# Patient Record
Sex: FEMALE | Race: OTHER | NOT HISPANIC OR LATINO
[De-identification: names, ages, dates, MRNs, and addresses within clinical notes are randomized per-mention and may not be internally consistent; named-entity substitution may affect disease eponyms.]

---

## 2018-01-26 PROBLEM — Z00.00 ENCOUNTER FOR PREVENTIVE HEALTH EXAMINATION: Status: ACTIVE | Noted: 2018-01-26

## 2018-01-29 ENCOUNTER — APPOINTMENT (OUTPATIENT)
Dept: ENDOCRINOLOGY | Facility: CLINIC | Age: 78
End: 2018-01-29
Payer: MEDICARE

## 2018-01-29 ENCOUNTER — CHART COPY (OUTPATIENT)
Age: 78
End: 2018-01-29

## 2018-01-29 PROCEDURE — 99204 OFFICE O/P NEW MOD 45 MIN: CPT

## 2018-01-30 ENCOUNTER — APPOINTMENT (OUTPATIENT)
Dept: ENDOCRINOLOGY | Facility: CLINIC | Age: 78
End: 2018-01-30

## 2018-05-07 ENCOUNTER — APPOINTMENT (OUTPATIENT)
Dept: ENDOCRINOLOGY | Facility: CLINIC | Age: 78
End: 2018-05-07
Payer: MEDICARE

## 2018-05-07 VITALS
DIASTOLIC BLOOD PRESSURE: 70 MMHG | HEIGHT: 60 IN | HEART RATE: 80 BPM | BODY MASS INDEX: 22.78 KG/M2 | WEIGHT: 116 LBS | SYSTOLIC BLOOD PRESSURE: 128 MMHG

## 2018-05-07 PROCEDURE — 99214 OFFICE O/P EST MOD 30 MIN: CPT

## 2018-07-09 ENCOUNTER — APPOINTMENT (OUTPATIENT)
Dept: ENDOCRINOLOGY | Facility: CLINIC | Age: 78
End: 2018-07-09
Payer: MEDICARE

## 2018-07-09 VITALS
BODY MASS INDEX: 24.15 KG/M2 | HEART RATE: 60 BPM | HEIGHT: 60 IN | SYSTOLIC BLOOD PRESSURE: 110 MMHG | DIASTOLIC BLOOD PRESSURE: 70 MMHG | WEIGHT: 123 LBS

## 2018-07-09 PROCEDURE — 99215 OFFICE O/P EST HI 40 MIN: CPT

## 2018-07-09 RX ORDER — ALENDRONATE SODIUM 70 MG/1
70 TABLET ORAL
Qty: 12 | Refills: 2 | Status: COMPLETED | COMMUNITY
End: 2018-07-09

## 2018-07-18 RX ORDER — BLOOD SUGAR DIAGNOSTIC
STRIP MISCELLANEOUS
Qty: 3 | Refills: 6 | Status: ACTIVE | COMMUNITY
Start: 2018-07-18 | End: 1900-01-01

## 2018-07-18 RX ORDER — ALCOHOL ANTISEPTIC PADS
PADS, MEDICATED (EA) TOPICAL
Qty: 3 | Refills: 6 | Status: ACTIVE | COMMUNITY
Start: 2018-07-18 | End: 1900-01-01

## 2018-09-10 ENCOUNTER — APPOINTMENT (OUTPATIENT)
Dept: ENDOCRINOLOGY | Facility: CLINIC | Age: 78
End: 2018-09-10
Payer: MEDICARE

## 2018-09-10 VITALS
BODY MASS INDEX: 24.35 KG/M2 | WEIGHT: 124 LBS | DIASTOLIC BLOOD PRESSURE: 80 MMHG | HEART RATE: 102 BPM | SYSTOLIC BLOOD PRESSURE: 140 MMHG | HEIGHT: 60 IN

## 2018-09-10 PROCEDURE — 99215 OFFICE O/P EST HI 40 MIN: CPT

## 2018-10-10 ENCOUNTER — APPOINTMENT (OUTPATIENT)
Dept: CT IMAGING | Facility: HOSPITAL | Age: 78
End: 2018-10-10

## 2018-10-10 ENCOUNTER — OUTPATIENT (OUTPATIENT)
Dept: OUTPATIENT SERVICES | Facility: HOSPITAL | Age: 78
LOS: 1 days | End: 2018-10-10
Payer: MEDICARE

## 2018-10-10 PROCEDURE — 75574 CT ANGIO HRT W/3D IMAGE: CPT

## 2018-10-10 PROCEDURE — 75574 CT ANGIO HRT W/3D IMAGE: CPT | Mod: 26

## 2018-11-05 ENCOUNTER — APPOINTMENT (OUTPATIENT)
Dept: ENDOCRINOLOGY | Facility: CLINIC | Age: 78
End: 2018-11-05
Payer: MEDICARE

## 2018-11-05 VITALS
BODY MASS INDEX: 23.95 KG/M2 | DIASTOLIC BLOOD PRESSURE: 70 MMHG | WEIGHT: 122 LBS | HEIGHT: 60 IN | SYSTOLIC BLOOD PRESSURE: 140 MMHG | HEART RATE: 90 BPM

## 2018-11-05 PROCEDURE — 99215 OFFICE O/P EST HI 40 MIN: CPT

## 2019-01-09 ENCOUNTER — RX RENEWAL (OUTPATIENT)
Age: 79
End: 2019-01-09

## 2019-04-01 ENCOUNTER — APPOINTMENT (OUTPATIENT)
Dept: ENDOCRINOLOGY | Facility: CLINIC | Age: 79
End: 2019-04-01
Payer: MEDICARE

## 2019-04-01 VITALS
SYSTOLIC BLOOD PRESSURE: 140 MMHG | DIASTOLIC BLOOD PRESSURE: 80 MMHG | WEIGHT: 121 LBS | HEART RATE: 82 BPM | BODY MASS INDEX: 23.75 KG/M2 | HEIGHT: 60 IN

## 2019-04-01 PROCEDURE — 99215 OFFICE O/P EST HI 40 MIN: CPT

## 2019-04-01 RX ORDER — CHOLECALCIFEROL (VITAMIN D3) 25 MCG
25 MCG TABLET ORAL DAILY
Qty: 90 | Refills: 3 | Status: ACTIVE | COMMUNITY
Start: 2019-04-01 | End: 1900-01-01

## 2019-04-01 RX ORDER — ERGOCALCIFEROL 1.25 MG/1
1.25 MG CAPSULE, LIQUID FILLED ORAL
Qty: 12 | Refills: 3 | Status: COMPLETED | COMMUNITY
Start: 2018-01-29 | End: 2019-04-01

## 2019-04-01 NOTE — PHYSICAL EXAM
[Alert] : alert [No Acute Distress] : no acute distress [Well Nourished] : well nourished [Well Developed] : well developed [Normal Sclera/Conjunctiva] : normal sclera/conjunctiva [EOMI] : extra ocular movement intact [Visual Fields Grossly Intact] : visual fields grossly intact [No Lid Lag] : no lid lag [Normal Oropharynx] : the oropharynx was normal [No LAD] : no lymphadenopathy [Thyroid Not Enlarged] : the thyroid was not enlarged [Well Healed Scar] : well healed scar [No Respiratory Distress] : no respiratory distress [No Accessory Muscle Use] : no accessory muscle use [Clear to Auscultation] : lungs were clear to auscultation bilaterally [Normal Rate] : heart rate was normal  [Normal S1, S2] : normal S1 and S2 [Regular Rhythm] : with a regular rhythm [Pedal Pulses Normal] : the pedal pulses are present [No Edema] : there was no peripheral edema [Normal Bowel Sounds] : normal bowel sounds [Not Tender] : non-tender [Soft] : abdomen soft [Not Distended] : not distended [Post Cervical Nodes] : posterior cervical nodes [Anterior Cervical Nodes] : anterior cervical nodes [Axillary Nodes] : axillary nodes [Normal] : normal and non tender [No Spinal Tenderness] : no spinal tenderness [Spine Straight] : spine straight [No Stigmata of Cushings Syndrome] : no stigmata of cushings syndrome [Normal Gait] : normal gait [Normal Strength/Tone] : muscle strength and tone were normal [No Rash] : no rash [Acanthosis Nigricans] : no acanthosis nigricans [Normal Reflexes] : deep tendon reflexes were 2+ and symmetric [No Tremors] : no tremors [Oriented x3] : oriented to person, place, and time [de-identified] : + proptosis L > R

## 2019-04-01 NOTE — HISTORY OF PRESENT ILLNESS
[FreeTextEntry1] : 80 y/o F w/ Hx of DM2, OP, graves disease c/b uphthalmopathy s/p TT. Here for f/u.\par \par 11/2018 Since LV, we increased prednisone to 20 mg BID. She reports improved ocular pain, visual acuity and reduced proptosis. Has apt for R eye decompressive sx in 11/20/2018. Since, she reports improvement of retro-ocular pain and swealling. No worsening proptosis or redness. Notes she feels well on 88 mcg, 10.2018 TSH at 2.8. Cards recently started her on Jardiance 25 mg PO QD. No reported nocturia or UTIs. Compliant w. lantus 15 units QHS, Victoza 1.8 mg QD, but only using Humalog 8 units AC lunch. \par \par 4/2019: Here for /fu, generally feels well and endorses no acute complaints. No interval events since LV. Today reports stable ophthalmopathy, denies pain, redness or worsening proptosis. compliant w/ prednisone 40 mg. reports ophthalmology f/u in 4/2019, plans for surgery were delayed given treatment response. FSG remain ~130-180. No hypoglycemia reported.\par She otherwise denies any f/c, CP, SOB, palpitations, tremors, depressed mood, anxiety, palpitations, n/v, stool/urinary abn, skin/weight changes, heat/cold intolerance, HAs, breast/nipple changes, polyuria/polydipsia/nocturia or other complaints. \par \par \par 9/2018: Here fo f/u, reports ongoing hyperglycemia. Tolerated max dose of victoza and Lantus 15 units QHS well. Notes worsening L eye proptosis w/o worsened eye sight. \par  She otherwise denies any f/c, CP, SOB, palpitations, tremors, depressed mood, anxiety, palpitations, n/v, stool/urinary abn, skin/weight changes, heat/cold intolerance, HAs, breast/nipple changes, polyuria/polydipsia/nocturia or other complaints.\par she endorses worsening hyperglycemia since starting steroids.\par Saw ophthalmology on 7/2018, she is waiting for evaluation of proptosis specialist.

## 2019-04-01 NOTE — ASSESSMENT
[FreeTextEntry1] : dm2: cont Lantus to 15 units QHS, contvicotza t o1.8 mg QD. cont humalog 8 units TIDAC Reassess on NV. Continue jardiance 25 mg for now, reviewed r/b/a and s/e including polyuria, bone density drops and UTIs. Verbalized understanding and agrees with treatment plan, will contact MD and seek emergency medical care if condition changes.\par \par \par Hypothyroidism, appears clinically/biochemically euthyroid,continue 88 mcg, proper intake reviewed. repeat TFTs prior to NV\par \par graves eye disease: moderate severity, clinical disease score stabilized, eye sight conserved, continue increased prednisone 40 mg QD, cont f/u w/ ophthalmology. Consider rituximab on the NV.\par \par OP screening: Reports no recent screening. Not taking vitamin D. Not on folsamax for unclear reasons. [Carbohydrate Consistent Diet] : carbohydrate consistent diet [Diabetes Foot Care] : diabetes foot care [Long Term Vascular Complications] : long term vascular complications of diabetes [Importance of Diet and Exercise] : importance of diet and exercise to improve glycemic control, achieve weight loss and improve cardiovascular health [Action and use of Insulin] : action and use of short and long-acting insulin [Incretin Mimetic Therapy] : Risks and benefits of incretin mimetic therapy were discussed with the patient including nauseau, pancreatitis and potential risk of medullary thyroid cancer

## 2019-07-15 ENCOUNTER — APPOINTMENT (OUTPATIENT)
Dept: ENDOCRINOLOGY | Facility: CLINIC | Age: 79
End: 2019-07-15
Payer: MEDICARE

## 2019-07-15 VITALS
SYSTOLIC BLOOD PRESSURE: 130 MMHG | HEIGHT: 60 IN | DIASTOLIC BLOOD PRESSURE: 70 MMHG | HEART RATE: 80 BPM | WEIGHT: 125 LBS | BODY MASS INDEX: 24.54 KG/M2

## 2019-07-15 PROCEDURE — 99215 OFFICE O/P EST HI 40 MIN: CPT

## 2019-07-15 NOTE — PHYSICAL EXAM
[Alert] : alert [No Acute Distress] : no acute distress [Well Nourished] : well nourished [Well Developed] : well developed [Normal Sclera/Conjunctiva] : normal sclera/conjunctiva [EOMI] : extra ocular movement intact [Visual Fields Grossly Intact] : visual fields grossly intact [No Lid Lag] : no lid lag [Normal Oropharynx] : the oropharynx was normal [No LAD] : no lymphadenopathy [Thyroid Not Enlarged] : the thyroid was not enlarged [Well Healed Scar] : well healed scar [No Respiratory Distress] : no respiratory distress [No Accessory Muscle Use] : no accessory muscle use [Clear to Auscultation] : lungs were clear to auscultation bilaterally [Normal Rate] : heart rate was normal  [Normal S1, S2] : normal S1 and S2 [Regular Rhythm] : with a regular rhythm [Pedal Pulses Normal] : the pedal pulses are present [No Edema] : there was no peripheral edema [Normal Bowel Sounds] : normal bowel sounds [Not Tender] : non-tender [Soft] : abdomen soft [Not Distended] : not distended [Post Cervical Nodes] : posterior cervical nodes [Anterior Cervical Nodes] : anterior cervical nodes [Axillary Nodes] : axillary nodes [Normal] : normal and non tender [No Spinal Tenderness] : no spinal tenderness [Spine Straight] : spine straight [No Stigmata of Cushings Syndrome] : no stigmata of cushings syndrome [Normal Gait] : normal gait [Normal Strength/Tone] : muscle strength and tone were normal [No Rash] : no rash [Normal Reflexes] : deep tendon reflexes were 2+ and symmetric [No Tremors] : no tremors [Oriented x3] : oriented to person, place, and time [Acanthosis Nigricans] : no acanthosis nigricans [de-identified] : + proptosis L > R

## 2019-07-15 NOTE — ASSESSMENT
[Carbohydrate Consistent Diet] : carbohydrate consistent diet [Diabetes Foot Care] : diabetes foot care [Long Term Vascular Complications] : long term vascular complications of diabetes [Importance of Diet and Exercise] : importance of diet and exercise to improve glycemic control, achieve weight loss and improve cardiovascular health [Action and use of Insulin] : action and use of short and long-acting insulin [Incretin Mimetic Therapy] : Risks and benefits of incretin mimetic therapy were discussed with the patient including nauseau, pancreatitis and potential risk of medullary thyroid cancer [FreeTextEntry1] : dm2: cont Lantus to 15 units QHS, cont vicotza to1.8 mg QD. increase humalog to 12 units TIDAC Reassess on NV. Stop jardiance 25 mg given reported vaginosis. Verbalized understanding and agrees with treatment plan, will contact MD and seek emergency medical care if condition changes.\par \par \par Hypothyroidism, appears clinically/biochemically euthyroid,continue 88 mcg, proper intake reviewed. repeat TFTs prior to NV\par \par graves eye disease: moderate severity, clinical disease score stabilized, eye sight conserved, continue prednisone 40 mg QD, cont f/u w/ ophthalmology, next due on 10/2019. Consider rituximab on the NV.\par \par OP screening: Reports no recent screening. Not taking vitamin D. Not on folsamax for unclear reasons. Referral for DEXA on 10/2019

## 2019-07-15 NOTE — HISTORY OF PRESENT ILLNESS
[FreeTextEntry1] : 80 y/o F w/ Hx of DM2, OP, graves disease c/b uphthalmopathy s/p TT. Here for f/u.\par \par 11/2018 Since LV, we increased prednisone to 20 mg BID. She reports improved ocular pain, visual acuity and reduced proptosis. Has apt for R eye decompressive sx in 11/20/2018. Since, she reports improvement of retro-ocular pain and swealling. No worsening proptosis or redness. Notes she feels well on 88 mcg, 10.2018 TSH at 2.8. Cards recently started her on Jardiance 25 mg PO QD. No reported nocturia or UTIs. Compliant w. lantus 15 units QHS, Victoza 1.8 mg QD, but only using Humalog 8 units AC lunch. \par \par 7/2019: Here for /fu, generally feels well and endorses no acute complaints. No interval events since LV. Today reports stable ophthalmopathy, denies pain, redness or worsening proptosis. compliant w/ prednisone 40 mg. reports ophthalmology f/u in 4/2019, plans for surgery were delayed given treatment response. FSG remain ~130-180. No hypoglycemia reported. rogel sendorse vaginal itching and rash, she is taking jardiance 25 mg PO QD (started by cards).\par She otherwise denies any f/c, CP, SOB, palpitations, tremors, depressed mood, anxiety, palpitations, n/v, stool/urinary abn, skin/weight changes, heat/cold intolerance, HAs, breast/nipple changes, polyuria/polydipsia/nocturia or other complaints. \par \par \par 9/2018: Here fo f/u, reports ongoing hyperglycemia. Tolerated max dose of victoza and Lantus 15 units QHS well. Notes worsening L eye proptosis w/o worsened eye sight. \par  She otherwise denies any f/c, CP, SOB, palpitations, tremors, depressed mood, anxiety, palpitations, n/v, stool/urinary abn, skin/weight changes, heat/cold intolerance, HAs, breast/nipple changes, polyuria/polydipsia/nocturia or other complaints.\par she endorses worsening hyperglycemia since starting steroids.\par Saw ophthalmology on 7/2018, she is waiting for evaluation of proptosis specialist.

## 2019-10-21 ENCOUNTER — APPOINTMENT (OUTPATIENT)
Dept: ENDOCRINOLOGY | Facility: CLINIC | Age: 79
End: 2019-10-21
Payer: MEDICARE

## 2019-10-21 VITALS
SYSTOLIC BLOOD PRESSURE: 120 MMHG | HEIGHT: 60 IN | WEIGHT: 129 LBS | BODY MASS INDEX: 25.32 KG/M2 | DIASTOLIC BLOOD PRESSURE: 80 MMHG | HEART RATE: 69 BPM

## 2019-10-21 PROCEDURE — 99215 OFFICE O/P EST HI 40 MIN: CPT

## 2019-10-21 RX ORDER — PREDNISONE 20 MG/1
20 TABLET ORAL DAILY
Qty: 60 | Refills: 3 | Status: ACTIVE | COMMUNITY
Start: 2018-05-07 | End: 1900-01-01

## 2019-10-21 NOTE — PHYSICAL EXAM
[Alert] : alert [No Acute Distress] : no acute distress [Well Nourished] : well nourished [Well Developed] : well developed [Normal Sclera/Conjunctiva] : normal sclera/conjunctiva [EOMI] : extra ocular movement intact [Visual Fields Grossly Intact] : visual fields grossly intact [No Lid Lag] : no lid lag [No LAD] : no lymphadenopathy [Normal Oropharynx] : the oropharynx was normal [Thyroid Not Enlarged] : the thyroid was not enlarged [Well Healed Scar] : well healed scar [No Respiratory Distress] : no respiratory distress [No Accessory Muscle Use] : no accessory muscle use [Clear to Auscultation] : lungs were clear to auscultation bilaterally [Normal Rate] : heart rate was normal  [Normal S1, S2] : normal S1 and S2 [Regular Rhythm] : with a regular rhythm [Pedal Pulses Normal] : the pedal pulses are present [No Edema] : there was no peripheral edema [Normal Bowel Sounds] : normal bowel sounds [Not Tender] : non-tender [Soft] : abdomen soft [Not Distended] : not distended [Post Cervical Nodes] : posterior cervical nodes [Anterior Cervical Nodes] : anterior cervical nodes [Axillary Nodes] : axillary nodes [Normal] : normal and non tender [No Spinal Tenderness] : no spinal tenderness [Spine Straight] : spine straight [No Stigmata of Cushings Syndrome] : no stigmata of cushings syndrome [Normal Gait] : normal gait [Normal Strength/Tone] : muscle strength and tone were normal [No Rash] : no rash [Normal Reflexes] : deep tendon reflexes were 2+ and symmetric [No Tremors] : no tremors [Oriented x3] : oriented to person, place, and time [Acanthosis Nigricans] : no acanthosis nigricans [de-identified] : + proptosis L > R

## 2019-10-21 NOTE — ASSESSMENT
[Carbohydrate Consistent Diet] : carbohydrate consistent diet [Diabetes Foot Care] : diabetes foot care [Long Term Vascular Complications] : long term vascular complications of diabetes [Importance of Diet and Exercise] : importance of diet and exercise to improve glycemic control, achieve weight loss and improve cardiovascular health [Incretin Mimetic Therapy] : Risks and benefits of incretin mimetic therapy were discussed with the patient including nauseau, pancreatitis and potential risk of medullary thyroid cancer [Action and use of Insulin] : action and use of short and long-acting insulin [FreeTextEntry1] : dm2: cont Lantus to 15 units QHS, cont vicotza to1.8 mg QD. increase humalog to 15 units TIDAC Reassess on NV. Stop jardiance 25 mg given reported vaginosis. Verbalized understanding and agrees with treatment plan, will contact MD and seek emergency medical care if condition changes. explained the potential high risk and toxicities with chronic insulin use including, but not limited to life threatening hypoglycemia and death, Verbalized understanding and agrees with treatment plan, will contact MD and seek emergency medical care if condition changes.\par \par \par \par Hypothyroidism, appears clinically/biochemically euthyroid,[id number] mcg, proper intake reviewed. repeat TFTs prior to NV\par \par graves eye disease: moderate severity, clinical disease score stabilized, eye sight conserved, continue prednisone 40 mg QD, cont f/u w/ ophthalmology, next due on 10/2019. Consider rituximab on the NV.\par \par OP screening: Reports no recent screening. Not taking vitamin D. Not on folsamax for unclear reasons. Referral for DEXA on 10/2019

## 2020-01-27 ENCOUNTER — APPOINTMENT (OUTPATIENT)
Dept: ENDOCRINOLOGY | Facility: CLINIC | Age: 80
End: 2020-01-27

## 2020-03-09 ENCOUNTER — APPOINTMENT (OUTPATIENT)
Dept: ENDOCRINOLOGY | Facility: CLINIC | Age: 80
End: 2020-03-09

## 2021-08-09 ENCOUNTER — APPOINTMENT (OUTPATIENT)
Dept: ENDOCRINOLOGY | Facility: CLINIC | Age: 81
End: 2021-08-09
Payer: MEDICARE

## 2021-08-09 VITALS
HEART RATE: 84 BPM | WEIGHT: 132 LBS | HEIGHT: 60 IN | DIASTOLIC BLOOD PRESSURE: 80 MMHG | SYSTOLIC BLOOD PRESSURE: 140 MMHG | BODY MASS INDEX: 25.91 KG/M2

## 2021-08-09 PROCEDURE — 99215 OFFICE O/P EST HI 40 MIN: CPT

## 2021-08-09 NOTE — HISTORY OF PRESENT ILLNESS
[FreeTextEntry1] : 80 y/o F w/ Hx of DM2, OP, graves disease c/b uphthalmopathy s/p TT. Here for f/u.\par \par  \par \par 8/2021: Here to re establish care, lost to f/u x 2 yearas, generally feels well and endorses no acute complaints. Interval recurrent ED visits for dysuria and recurrent UTIs. Today reports stable ophthalmopathy, not on steroids at ths time. reports ophthalmology f/u in 4/2021, no plans for surgery were delayed given treatment response. FSG remain ~. hypoglycemia reported she is taking jardiance 25 mg PO QD (restarted by cards). PCP instructed her to stop victoza/humalog and increase Lantus to 35units, reports episodes of severe early AM hypoglycemia which wake her up\par She otherwise denies any f/c, CP, SOB, palpitations, tremors, depressed mood, anxiety, palpitations, n/v, stool/urinary abn, skin/weight changes, heat/cold intolerance, HAs, breast/nipple changes, polyuria/polydipsia/nocturia or other complaints. \par \par \par 9/2018: Here fo f/u, reports ongoing hyperglycemia. Tolerated max dose of victoza and Lantus 15 units QHS well. Notes worsening L eye proptosis w/o worsened eye sight. \par  She otherwise denies any f/c, CP, SOB, palpitations, tremors, depressed mood, anxiety, palpitations, n/v, stool/urinary abn, skin/weight changes, heat/cold intolerance, HAs, breast/nipple changes, polyuria/polydipsia/nocturia or other complaints.\par she endorses worsening hyperglycemia since starting steroids.\par Saw ophthalmology on 7/2018, she is waiting for evaluation of proptosis specialist.

## 2021-08-30 ENCOUNTER — NON-APPOINTMENT (OUTPATIENT)
Age: 81
End: 2021-08-30

## 2021-09-02 RX ORDER — INSULIN GLARGINE 100 [IU]/ML
100 INJECTION, SOLUTION SUBCUTANEOUS
Qty: 2 | Refills: 3 | Status: COMPLETED | COMMUNITY
Start: 2018-07-09 | End: 2021-09-02

## 2021-09-07 ENCOUNTER — NON-APPOINTMENT (OUTPATIENT)
Age: 81
End: 2021-09-07

## 2021-10-11 ENCOUNTER — APPOINTMENT (OUTPATIENT)
Dept: ENDOCRINOLOGY | Facility: CLINIC | Age: 81
End: 2021-10-11
Payer: MEDICARE

## 2021-10-11 PROCEDURE — 99215 OFFICE O/P EST HI 40 MIN: CPT

## 2021-10-11 RX ORDER — LIRAGLUTIDE 6 MG/ML
18 INJECTION SUBCUTANEOUS DAILY
Qty: 3 | Refills: 5 | Status: COMPLETED | COMMUNITY
Start: 2019-07-15 | End: 2021-10-11

## 2021-10-11 RX ORDER — LIRAGLUTIDE 6 MG/ML
18 INJECTION SUBCUTANEOUS DAILY
Qty: 3 | Refills: 3 | Status: COMPLETED | COMMUNITY
End: 2021-10-11

## 2021-10-11 RX ORDER — INSULIN LISPRO 100 U/ML
100 INJECTION, SOLUTION SUBCUTANEOUS
Qty: 2 | Refills: 1 | Status: ACTIVE | COMMUNITY
Start: 2018-09-10 | End: 1900-01-01

## 2021-10-11 NOTE — HISTORY OF PRESENT ILLNESS
[FreeTextEntry1] : 82 y/o F w/ Hx of DM2, OP, graves disease c/b uphthalmopathy s/p TT. Here for f/u.\par \par  \par \par 10/2021: Her for f/u, generally feels well and endorses no acute complaints. no further UTIs or ED visits after jardiance d/anjel. complisnt e/ basal bolus regimen as instructed\par She otherwise denies any f/c, CP, SOB, palpitations, tremors, depressed mood, anxiety, palpitations, n/v, stool/urinary abn, skin/weight changes, heat/cold intolerance, HAs, breast/nipple changes, polyuria/polydipsia/nocturia or other complaints. \par \par \par 9/2018: Here fo f/u, reports ongoing hyperglycemia. Tolerated max dose of victoza and Lantus 15 units QHS well. Notes worsening L eye proptosis w/o worsened eye sight. \par  She otherwise denies any f/c, CP, SOB, palpitations, tremors, depressed mood, anxiety, palpitations, n/v, stool/urinary abn, skin/weight changes, heat/cold intolerance, HAs, breast/nipple changes, polyuria/polydipsia/nocturia or other complaints.\par she endorses worsening hyperglycemia since starting steroids.\par Saw ophthalmology on 7/2018, she is waiting for evaluation of proptosis specialist.

## 2021-10-11 NOTE — ASSESSMENT
[Carbohydrate Consistent Diet] : carbohydrate consistent diet [Diabetes Foot Care] : diabetes foot care [Long Term Vascular Complications] : long term vascular complications of diabetes [Importance of Diet and Exercise] : importance of diet and exercise to improve glycemic control, achieve weight loss and improve cardiovascular health [Action and use of Insulin] : action and use of short and long-acting insulin [Incretin Mimetic Therapy] : Risks and benefits of incretin mimetic therapy were discussed with the patient including nauseau, pancreatitis and potential risk of medullary thyroid cancer [FreeTextEntry1] : dm2: cont Tresiba to 20 units QHS, restart admelog to 12 units TIDAC Reassess on NV. Stop jardiance 25 mg given reported recurrent vaginosis. Verbalized understanding and agrees with treatment plan, will contact MD and seek emergency medical care if condition changes. explained the potential high risk and toxicities with chronic insulin use including, but not limited to life threatening hypoglycemia and death, Verbalized understanding and agrees with treatment plan, will contact MD and seek emergency medical care if condition changes.\par \par \par \par Hypothyroidism, appears clinically/biochemically euthyroid,continue 88 mcg, proper intake reviewed. repeat TFTs prior to NV\par \par graves eye disease: moderate severity, clinical disease score stabilized, eye sight conserved, hold prednisone 40 mg QD, cont f/u w/ ophthalmology, next due on 10/2021. Consider rituximab on the NV.\par \par OP screening: Reports no recent screening. Not taking vitamin D. Not on folsamax for unclear reasons. Referral for DEXA on 10/2019

## 2021-11-22 ENCOUNTER — RX RENEWAL (OUTPATIENT)
Age: 81
End: 2021-11-22

## 2022-01-25 RX ORDER — LANCETS 33 GAUGE
EACH MISCELLANEOUS
Qty: 400 | Refills: 5 | Status: ACTIVE | COMMUNITY
Start: 2022-01-25 | End: 1900-01-01

## 2022-02-14 ENCOUNTER — APPOINTMENT (OUTPATIENT)
Dept: ENDOCRINOLOGY | Facility: CLINIC | Age: 82
End: 2022-02-14
Payer: MEDICARE

## 2022-02-14 DIAGNOSIS — E10.8 TYPE 1 DIABETES MELLITUS WITH UNSPECIFIED COMPLICATIONS: ICD-10-CM

## 2022-02-14 DIAGNOSIS — E10.65 TYPE 1 DIABETES MELLITUS WITH UNSPECIFIED COMPLICATIONS: ICD-10-CM

## 2022-02-14 PROCEDURE — 99215 OFFICE O/P EST HI 40 MIN: CPT

## 2022-02-14 NOTE — ASSESSMENT
[FreeTextEntry1] : dm2: cont Tresiba to 20 units QHS, reduce admelog to 10 units TIDAC Reassess on NV. Stop jardiance 25 mg given reported recurrent vaginosis. Verbalized understanding and agrees with treatment plan, will contact MD and seek emergency medical care if condition changes. explained the potential high risk and toxicities with chronic insulin use including, but not limited to life threatening hypoglycemia and death, Verbalized understanding and agrees with treatment plan, will contact MD and seek emergency medical care if condition changes.\par \par \par \par Hypothyroidism, appears clinically/biochemically euthyroid,continue 88 mcg, proper intake reviewed. repeat TFTs prior to NV\par \par graves eye disease: moderate severity, clinical disease score stabilized, eye sight conserved, hold prednisone 40 mg QD, cont f/u w/ ophthalmology, next due on 10/2021. Consider rituximab on the NV.\par \par OP screening: Reports no recent screening. Not taking vitamin D. Not on folsamax for unclear reasons. Referral for DEXA on 10/2019 [Carbohydrate Consistent Diet] : carbohydrate consistent diet [Diabetes Foot Care] : diabetes foot care [Long Term Vascular Complications] : long term vascular complications of diabetes [Importance of Diet and Exercise] : importance of diet and exercise to improve glycemic control, achieve weight loss and improve cardiovascular health [Action and use of Insulin] : action and use of short and long-acting insulin [Incretin Mimetic Therapy] : Risks and benefits of incretin mimetic therapy were discussed with the patient including nauseau, pancreatitis and potential risk of medullary thyroid cancer

## 2022-02-14 NOTE — HISTORY OF PRESENT ILLNESS
[FreeTextEntry1] : 82 y/o F w/ Hx of DM2, OP, graves disease c/b uphthalmopathy s/p TT. Here for f/u.\par \par  \par \par 2/2022: Her for f/u, generally feels well and endorses no acute complaints. no further UTIs or ED visits after jardiance d/anjel. compliant w/ basal bolus regimen as instructed. FSG in AM ~ 120, post prandial hyperglycemia resolved.\par She otherwise denies any f/c, CP, SOB, palpitations, tremors, depressed mood, anxiety, palpitations, n/v, stool/urinary abn, skin/weight changes, heat/cold intolerance, HAs, breast/nipple changes, polyuria/polydipsia/nocturia or other complaints. \par \par \par 9/2018: Here fo f/u, reports ongoing hyperglycemia. Tolerated max dose of victoza and Lantus 15 units QHS well. Notes worsening L eye proptosis w/o worsened eye sight. \par  She otherwise denies any f/c, CP, SOB, palpitations, tremors, depressed mood, anxiety, palpitations, n/v, stool/urinary abn, skin/weight changes, heat/cold intolerance, HAs, breast/nipple changes, polyuria/polydipsia/nocturia or other complaints.\par she endorses worsening hyperglycemia since starting steroids.\par Saw ophthalmology on 7/2018, she is waiting for evaluation of proptosis specialist.

## 2022-07-18 ENCOUNTER — APPOINTMENT (OUTPATIENT)
Dept: ENDOCRINOLOGY | Facility: CLINIC | Age: 82
End: 2022-07-18

## 2022-07-18 VITALS
SYSTOLIC BLOOD PRESSURE: 140 MMHG | WEIGHT: 140 LBS | HEART RATE: 79 BPM | HEIGHT: 60 IN | BODY MASS INDEX: 27.48 KG/M2 | DIASTOLIC BLOOD PRESSURE: 80 MMHG

## 2022-07-18 PROCEDURE — 99215 OFFICE O/P EST HI 40 MIN: CPT

## 2022-07-18 NOTE — HISTORY OF PRESENT ILLNESS
[FreeTextEntry1] : 83 y/o F w/ Hx of DM2, OP, graves disease c/b uphthalmopathy s/p TT. Here for f/u.\par \par  \par \par 7/2022: Her for f/u, generally feels well and endorses no acute complaints. no further UTIs or ED visits after jardiance d/anjel. compliant w/ basal bolus regimen as instructed. FSG in AM ~ 120, post prandial hyperglycemia resolved.\par She otherwise denies any f/c, CP, SOB, palpitations, tremors, depressed mood, anxiety, palpitations, n/v, stool/urinary abn, skin/weight changes, heat/cold intolerance, HAs, breast/nipple changes, polyuria/polydipsia/nocturia or other complaints. \par \par \par 9/2018: Here fo f/u, reports ongoing hyperglycemia. Tolerated max dose of victoza and Lantus 15 units QHS well. Notes worsening L eye proptosis w/o worsened eye sight. \par  She otherwise denies any f/c, CP, SOB, palpitations, tremors, depressed mood, anxiety, palpitations, n/v, stool/urinary abn, skin/weight changes, heat/cold intolerance, HAs, breast/nipple changes, polyuria/polydipsia/nocturia or other complaints.\par she endorses worsening hyperglycemia since starting steroids.\par Saw ophthalmology on 7/2018, she is waiting for evaluation of proptosis specialist.

## 2022-07-18 NOTE — DATA REVIEWED
[FreeTextEntry1] : 9/19\par CMP wnl Cr 0.8\par ldl 69\par a1c 8.6\par vit d 53\par \par 7/2022 a1c 7 CBC CMP wnl, LDL 78

## 2022-08-18 ENCOUNTER — NON-APPOINTMENT (OUTPATIENT)
Age: 82
End: 2022-08-18

## 2022-08-18 RX ORDER — INSULIN ASPART 100 [IU]/ML
100 INJECTION, SOLUTION INTRAVENOUS; SUBCUTANEOUS
Qty: 2 | Refills: 3 | Status: ACTIVE | COMMUNITY
Start: 2022-08-18 | End: 1900-01-01

## 2022-10-20 ENCOUNTER — APPOINTMENT (OUTPATIENT)
Dept: ENDOCRINOLOGY | Facility: CLINIC | Age: 82
End: 2022-10-20

## 2022-11-21 ENCOUNTER — APPOINTMENT (OUTPATIENT)
Dept: ENDOCRINOLOGY | Facility: CLINIC | Age: 82
End: 2022-11-21

## 2022-11-21 DIAGNOSIS — E05.00 THYROTOXICOSIS WITH DIFFUSE GOITER W/OUT THYROTOXIC CRISIS OR STORM: ICD-10-CM

## 2022-11-21 PROCEDURE — 99215 OFFICE O/P EST HI 40 MIN: CPT

## 2022-11-21 RX ORDER — AMLODIPINE BESYLATE 10 MG/1
10 TABLET ORAL DAILY
Qty: 90 | Refills: 3 | Status: COMPLETED | COMMUNITY
Start: 2018-07-18 | End: 2022-11-21

## 2022-11-21 RX ORDER — PEN NEEDLE, DIABETIC 29 G X1/2"
32G X 4 MM NEEDLE, DISPOSABLE MISCELLANEOUS
Qty: 3 | Refills: 3 | Status: ACTIVE | COMMUNITY
Start: 2018-09-10 | End: 1900-01-01

## 2022-11-21 RX ORDER — AMLODIPINE AND OLMESARTAN MEDOXOMIL 5; 20 MG/1; MG/1
5-20 TABLET ORAL DAILY
Qty: 90 | Refills: 0 | Status: ACTIVE | COMMUNITY
Start: 2022-06-28 | End: 1900-01-01

## 2022-11-21 NOTE — ASSESSMENT
[Carbohydrate Consistent Diet] : carbohydrate consistent diet [Diabetes Foot Care] : diabetes foot care [Long Term Vascular Complications] : long term vascular complications of diabetes [Importance of Diet and Exercise] : importance of diet and exercise to improve glycemic control, achieve weight loss and improve cardiovascular health [Action and use of Insulin] : action and use of short and long-acting insulin [Incretin Mimetic Therapy] : Risks and benefits of incretin mimetic therapy were discussed with the patient including nauseau, pancreatitis and potential risk of medullary thyroid cancer [FreeTextEntry1] : dm2: cont Tresiba to 20 units QHS, reduce admelog to 10 units TIDAC Reassess on NV. Stop jardiance 25 mg given reported recurrent vaginosis. Verbalized understanding and agrees with treatment plan, will contact MD and seek emergency medical care if condition changes. explained the potential high risk and toxicities with chronic insulin use including, but not limited to life threatening hypoglycemia and death, Verbalized understanding and agrees with treatment plan, will contact MD and seek emergency medical care if condition changes.\par \par \par \par Hypothyroidism, appears clinically/biochemically euthyroid,continue 88 mcg, proper intake reviewed. repeat TFTs prior to NV\par \par graves eye disease: moderate severity, clinical disease score stabilized, eye sight conserved, hold prednisone 40 mg QD, cont f/u w/ ophthalmology, next due on 10/2021. Consider Tepezza on the NV.\par \par OP screening: Reports no recent screening. Not taking vitamin D. Not on folsamax for unclear reasons. obtain DXA from 2020

## 2022-11-21 NOTE — HISTORY OF PRESENT ILLNESS
[FreeTextEntry1] : 81 y/o F w/ Hx of DM2, OP, graves disease c/b uphthalmopathy s/p TT. Here for f/u.\par \par  \par \par 11/2022: Her for f/u, generally feels well and endorses no acute complaints. no further UTIs or ED visits after jardiance d/anjel. compliant w/ basal bolus regimen as instructed. FSG in AM ~ 120, post prandial hyperglycemia resolved.\par She otherwise denies any f/c, CP, SOB, palpitations, tremors, depressed mood, anxiety, palpitations, n/v, stool/urinary abn, skin/weight changes, heat/cold intolerance, HAs, breast/nipple changes, polyuria/polydipsia/nocturia or other complaints. \par \par \par 9/2018: Here fo f/u, reports ongoing hyperglycemia. Tolerated max dose of victoza and Lantus 15 units QHS well. Notes worsening L eye proptosis w/o worsened eye sight. \par  She otherwise denies any f/c, CP, SOB, palpitations, tremors, depressed mood, anxiety, palpitations, n/v, stool/urinary abn, skin/weight changes, heat/cold intolerance, HAs, breast/nipple changes, polyuria/polydipsia/nocturia or other complaints.\par she endorses worsening hyperglycemia since starting steroids.\par Saw ophthalmology on 7/2018, she is waiting for evaluation of proptosis specialist.

## 2022-12-08 RX ORDER — ATORVASTATIN CALCIUM 40 MG/1
40 TABLET, FILM COATED ORAL
Qty: 90 | Refills: 3 | Status: ACTIVE | COMMUNITY
Start: 2019-04-01 | End: 1900-01-01

## 2023-03-20 ENCOUNTER — APPOINTMENT (OUTPATIENT)
Dept: ENDOCRINOLOGY | Facility: CLINIC | Age: 83
End: 2023-03-20
Payer: MEDICARE

## 2023-03-20 DIAGNOSIS — E55.9 VITAMIN D DEFICIENCY, UNSPECIFIED: ICD-10-CM

## 2023-03-20 PROCEDURE — ZZZZZ: CPT

## 2023-03-20 RX ORDER — INSULIN DEGLUDEC INJECTION 100 U/ML
100 INJECTION, SOLUTION SUBCUTANEOUS DAILY
Qty: 3 | Refills: 3 | Status: ACTIVE | COMMUNITY
Start: 2021-09-02 | End: 1900-01-01

## 2023-03-20 RX ORDER — LANCETS 33 GAUGE
EACH MISCELLANEOUS
Qty: 300 | Refills: 1 | Status: ACTIVE | COMMUNITY
Start: 2023-03-20 | End: 1900-01-01

## 2023-03-20 RX ORDER — BLOOD SUGAR DIAGNOSTIC
STRIP MISCELLANEOUS 3 TIMES DAILY
Qty: 3 | Refills: 3 | Status: ACTIVE | COMMUNITY
Start: 2023-03-20 | End: 1900-01-01

## 2023-03-20 RX ORDER — LEVOTHYROXINE SODIUM 0.09 MG/1
88 TABLET ORAL DAILY
Qty: 90 | Refills: 1 | Status: ACTIVE | COMMUNITY
Start: 2018-01-29 | End: 1900-01-01

## 2023-03-20 RX ORDER — INSULIN LISPRO 100 [IU]/ML
100 INJECTION, SOLUTION INTRAVENOUS; SUBCUTANEOUS
Qty: 45 | Refills: 3 | Status: ACTIVE | COMMUNITY
Start: 2021-11-22 | End: 1900-01-01

## 2023-03-20 RX ORDER — BLOOD-GLUCOSE METER
W/DEVICE KIT MISCELLANEOUS
Qty: 1 | Refills: 0 | Status: ACTIVE | COMMUNITY
Start: 2023-03-20 | End: 1900-01-01

## 2023-03-20 NOTE — HISTORY OF PRESENT ILLNESS
[FreeTextEntry1] : 82 y/o F w/ Hx of DM2, OP, graves disease c/b uphthalmopathy s/p TT. Here for f/u.\par \par  \par \par 3/2023: Her for f/u, generally feels well and endorses no acute complaints. no further UTIs or ED visits after jardiance d/anjel. compliant w/ basal bolus regimen as instructed. FSG in AM ~ 120, post prandial hyperglycemia resolved. reports initiation of alendronate w/o issues, good intake and tolerance, no interval falls or fractures, TSH at 10, but Ft4 at 1.1, good compliance w/ LT4 88 mcg reported. no hypoglycemia reported\par She otherwise denies any f/c, CP, SOB, palpitations, tremors, depressed mood, anxiety, palpitations, n/v, stool/urinary abn, skin/weight changes, heat/cold intolerance, HAs, breast/nipple changes, polyuria/polydipsia/nocturia or other complaints. \par \par \par 9/2018: Here fo f/u, reports ongoing hyperglycemia. Tolerated max dose of victoza and Lantus 15 units QHS well. Notes worsening L eye proptosis w/o worsened eye sight. \par  She otherwise denies any f/c, CP, SOB, palpitations, tremors, depressed mood, anxiety, palpitations, n/v, stool/urinary abn, skin/weight changes, heat/cold intolerance, HAs, breast/nipple changes, polyuria/polydipsia/nocturia or other complaints.\par she endorses worsening hyperglycemia since starting steroids.\par Saw ophthalmology on 7/2018, she is waiting for evaluation of proptosis specialist.

## 2023-03-20 NOTE — ASSESSMENT
[Carbohydrate Consistent Diet] : carbohydrate consistent diet [Diabetes Foot Care] : diabetes foot care [Long Term Vascular Complications] : long term vascular complications of diabetes [Importance of Diet and Exercise] : importance of diet and exercise to improve glycemic control, achieve weight loss and improve cardiovascular health [Action and use of Insulin] : action and use of short and long-acting insulin [Incretin Mimetic Therapy] : Risks and benefits of incretin mimetic therapy were discussed with the patient including nauseau, pancreatitis and potential risk of medullary thyroid cancer [FreeTextEntry1] : dm2: cont Tresiba to 20 units QHS, reduce admelog to 10 units TIDAC Reassess on NV. Stop jardiance 25 mg given reported recurrent vaginosis. Verbalized understanding and agrees with treatment plan, will contact MD and seek emergency medical care if condition changes. explained the potential high risk and toxicities with chronic insulin use including, but not limited to life threatening hypoglycemia and death, Verbalized understanding and agrees with treatment plan, will contact MD and seek emergency medical care if condition changes.\par \par \par \par Hypothyroidism, appears clinically/biochemically euthyroid,continue 88 mcg, proper intake reviewed. repeat TFTs prior to NV\par \par graves eye disease: moderate severity, clinical disease score stabilized, eye sight conserved, hold prednisone 40 mg QD, cont f/u w/ ophthalmology, next due on 10/2021. Consider Tepezza on the NV.\par \par OP:  Now on folsamax , 8/2021 w/ OP, repeat in 8/2023 for monitoring, reviewed dietary Ca and Vit D replacement

## 2023-03-21 LAB — MICROALBUMIN/CREAT 24H UR-RTO: 10

## 2023-03-30 RX ORDER — BLOOD-GLUCOSE METER
W/DEVICE EACH MISCELLANEOUS
Qty: 1 | Refills: 0 | Status: ACTIVE | COMMUNITY
Start: 2022-01-25 | End: 1900-01-01

## 2023-03-30 RX ORDER — BLOOD SUGAR DIAGNOSTIC
STRIP MISCELLANEOUS 4 TIMES DAILY
Qty: 400 | Refills: 1 | Status: ACTIVE | COMMUNITY
Start: 2022-01-25 | End: 1900-01-01

## 2023-06-26 ENCOUNTER — APPOINTMENT (OUTPATIENT)
Dept: ENDOCRINOLOGY | Facility: CLINIC | Age: 83
End: 2023-06-26
Payer: MEDICARE

## 2023-06-26 VITALS
WEIGHT: 140 LBS | BODY MASS INDEX: 27.48 KG/M2 | SYSTOLIC BLOOD PRESSURE: 130 MMHG | DIASTOLIC BLOOD PRESSURE: 80 MMHG | HEART RATE: 80 BPM | HEIGHT: 60 IN

## 2023-06-26 DIAGNOSIS — M81.0 AGE-RELATED OSTEOPOROSIS W/OUT CURRENT PATHOLOGICAL FRACTURE: ICD-10-CM

## 2023-06-26 DIAGNOSIS — I10 ESSENTIAL (PRIMARY) HYPERTENSION: ICD-10-CM

## 2023-06-26 DIAGNOSIS — E10.65 TYPE 1 DIABETES MELLITUS WITH HYPERGLYCEMIA: ICD-10-CM

## 2023-06-26 DIAGNOSIS — E03.9 HYPOTHYROIDISM, UNSPECIFIED: ICD-10-CM

## 2023-06-26 PROCEDURE — 99215 OFFICE O/P EST HI 40 MIN: CPT

## 2023-06-26 RX ORDER — BLOOD-GLUCOSE METER
KIT MISCELLANEOUS
Qty: 300 | Refills: 3 | Status: ACTIVE | COMMUNITY
Start: 2023-06-26 | End: 1900-01-01

## 2023-06-26 NOTE — PHYSICAL EXAM
[Alert] : alert [Well Nourished] : well nourished [No Acute Distress] : no acute distress [Well Developed] : well developed [Normal Sclera/Conjunctiva] : normal sclera/conjunctiva [EOMI] : extra ocular movement intact [No Proptosis] : no proptosis [Normal Oropharynx] : the oropharynx was normal [Thyroid Not Enlarged] : the thyroid was not enlarged [No Thyroid Nodules] : no palpable thyroid nodules [No Respiratory Distress] : no respiratory distress [No Accessory Muscle Use] : no accessory muscle use [Clear to Auscultation] : lungs were clear to auscultation bilaterally [Normal Rate] : heart rate was normal [Normal S1, S2] : normal S1 and S2 [Regular Rhythm] : with a regular rhythm [No Edema] : no peripheral edema [Pedal Pulses Normal] : the pedal pulses are present [Normal Bowel Sounds] : normal bowel sounds [Not Tender] : non-tender [Not Distended] : not distended [Soft] : abdomen soft [Normal Anterior Cervical Nodes] : no anterior cervical lymphadenopathy [No Spinal Tenderness] : no spinal tenderness [Spine Straight] : spine straight [Normal Gait] : normal gait [No Stigmata of Cushings Syndrome] : no stigmata of Cushings Syndrome [Normal Strength/Tone] : muscle strength and tone were normal [No Rash] : no rash [Normal Reflexes] : deep tendon reflexes were 2+ and symmetric [No Tremors] : no tremors [Oriented x3] : oriented to person, place, and time [Acanthosis Nigricans] : no acanthosis nigricans

## 2023-06-26 NOTE — HISTORY OF PRESENT ILLNESS
[FreeTextEntry1] : 82 y/o F w/ Hx of DM2, OP, graves disease c/b uphthalmopathy s/p TT. Here for f/u.\par \par  \par \par 6/2023: Her for f/u, generally feels well and endorses no acute complaints. no further UTIs or ED visits after jardiance d/anjel. compliant w/ basal bolus regimen as instructed. FSG in AM ~ 120, post prandial hyperglycemia resolved. reports initiation of alendronate w/o issues, good intake and tolerance, no interval falls or fractures, TSH at 10, but Ft4 at 1.1, good compliance w/ LT4 88 mcg reported. no hypoglycemia reported\par She otherwise denies any f/c, CP, SOB, palpitations, tremors, depressed mood, anxiety, palpitations, n/v, stool/urinary abn, skin/weight changes, heat/cold intolerance, HAs, breast/nipple changes, polyuria/polydipsia/nocturia or other complaints. \par \par \par 9/2018: Here fo f/u, reports ongoing hyperglycemia. Tolerated max dose of victoza and Lantus 15 units QHS well. Notes worsening L eye proptosis w/o worsened eye sight. \par  She otherwise denies any f/c, CP, SOB, palpitations, tremors, depressed mood, anxiety, palpitations, n/v, stool/urinary abn, skin/weight changes, heat/cold intolerance, HAs, breast/nipple changes, polyuria/polydipsia/nocturia or other complaints.\par she endorses worsening hyperglycemia since starting steroids.\par Saw ophthalmology on 7/2018, she is waiting for evaluation of proptosis specialist.

## 2023-11-06 ENCOUNTER — APPOINTMENT (OUTPATIENT)
Dept: ENDOCRINOLOGY | Facility: CLINIC | Age: 83
End: 2023-11-06

## 2024-07-15 ENCOUNTER — OFFICE (OUTPATIENT)
Facility: LOCATION | Age: 84
Setting detail: OPHTHALMOLOGY
End: 2024-07-15
Payer: COMMERCIAL

## 2024-07-15 DIAGNOSIS — H05.242: ICD-10-CM

## 2024-07-15 DIAGNOSIS — H40.1133: ICD-10-CM

## 2024-07-15 DIAGNOSIS — H16.223: ICD-10-CM

## 2024-07-15 PROCEDURE — 92133 CPTRZD OPH DX IMG PST SGM ON: CPT

## 2024-07-15 PROCEDURE — 99213 OFFICE O/P EST LOW 20 MIN: CPT

## 2024-10-21 ENCOUNTER — RX ONLY (RX ONLY)
Age: 84
End: 2024-10-21

## 2024-10-21 ENCOUNTER — OFFICE (OUTPATIENT)
Facility: LOCATION | Age: 84
Setting detail: OPHTHALMOLOGY
End: 2024-10-21
Payer: COMMERCIAL

## 2024-10-21 DIAGNOSIS — H05.242: ICD-10-CM

## 2024-10-21 DIAGNOSIS — H02.135: ICD-10-CM

## 2024-10-21 DIAGNOSIS — H40.1133: ICD-10-CM

## 2024-10-21 DIAGNOSIS — H16.223: ICD-10-CM

## 2024-10-21 PROBLEM — H44.2E2: Status: ACTIVE | Noted: 2024-10-21

## 2024-10-21 PROCEDURE — 92250 FUNDUS PHOTOGRAPHY W/I&R: CPT

## 2024-10-21 PROCEDURE — 92083 EXTENDED VISUAL FIELD XM: CPT

## 2024-10-21 PROCEDURE — 99213 OFFICE O/P EST LOW 20 MIN: CPT

## 2024-10-21 ASSESSMENT — TEAR BREAK UP TIME (TBUT)
OD_TBUT: 1+
OS_TBUT: 1+

## 2024-10-21 ASSESSMENT — REFRACTION_MANIFEST
OS_AXIS: 094
OS_VA1: 20/CF
OD_AXIS: 066
OS_SPHERE: -1.50
OD_SPHERE: +1.25
OD_VA1: 20/30-2
OD_CYLINDER: -1.75
OS_CYLINDER: -1.00

## 2024-10-21 ASSESSMENT — KERATOMETRY
OS_AXISANGLE_DEGREES: 044
OS_K2POWER_DIOPTERS: 46.25
OD_AXISANGLE_DEGREES: 144
OD_K1POWER_DIOPTERS: 44.25
OS_K1POWER_DIOPTERS: 45.00
OD_K2POWER_DIOPTERS: 46.25

## 2024-10-21 ASSESSMENT — PACHYMETRY
OS_CT_UM: 520
OD_CT_CORRECTION: 4
OS_CT_CORRECTION: 1
OD_CT_UM: 498

## 2024-10-21 ASSESSMENT — REFRACTION_AUTOREFRACTION
OD_SPHERE: +1.25
OD_AXIS: 066
OS_CYLINDER: -1.00
OD_CYLINDER: -1.75
OS_SPHERE: -1.50
OS_AXIS: 094

## 2024-10-21 ASSESSMENT — VISUAL ACUITY
OS_BCVA: 20/
OD_BCVA: 20/

## 2024-10-21 ASSESSMENT — CONFRONTATIONAL VISUAL FIELD TEST (CVF)
OD_FINDINGS: FULL
OS_FINDINGS: FULL

## 2024-11-07 ENCOUNTER — APPOINTMENT (OUTPATIENT)
Dept: PODIATRY | Facility: CLINIC | Age: 84
End: 2024-11-07

## 2025-01-20 ENCOUNTER — OFFICE (OUTPATIENT)
Facility: LOCATION | Age: 85
Setting detail: OPHTHALMOLOGY
End: 2025-01-20
Payer: COMMERCIAL

## 2025-01-20 DIAGNOSIS — H43.813: ICD-10-CM

## 2025-01-20 DIAGNOSIS — H16.223: ICD-10-CM

## 2025-01-20 DIAGNOSIS — H02.135: ICD-10-CM

## 2025-01-20 DIAGNOSIS — H40.1133: ICD-10-CM

## 2025-01-20 PROBLEM — E11.9 DIABETES TYPE 2 NO RETINOPATHY; 
BOTH EYES: Status: ACTIVE | Noted: 2025-01-20

## 2025-01-20 PROBLEM — H35.54 RPE CHANGES ; RIGHT EYE: Status: ACTIVE | Noted: 2025-01-20

## 2025-01-20 PROCEDURE — 92134 CPTRZ OPH DX IMG PST SGM RTA: CPT

## 2025-01-20 PROCEDURE — 92202 OPSCPY EXTND ON/MAC DRAW: CPT

## 2025-01-20 PROCEDURE — 92014 COMPRE OPH EXAM EST PT 1/>: CPT

## 2025-01-20 ASSESSMENT — REFRACTION_AUTOREFRACTION
OS_CYLINDER: -0.25
OS_SPHERE: -1.75
OD_AXIS: 067
OS_AXIS: 147
OD_SPHERE: +1.25
OD_CYLINDER: -2.00

## 2025-01-20 ASSESSMENT — REFRACTION_MANIFEST
OD_CYLINDER: -2.00
OS_SPHERE: -1.75
OD_VA1: 20/40
OS_VA1: 20/250
OD_SPHERE: +1.25
OD_AXIS: 067
OS_AXIS: 147
OS_CYLINDER: -0.25

## 2025-01-20 ASSESSMENT — REFRACTION_CURRENTRX
OS_CYLINDER: -0.50
OD_OVR_VA: 20/
OS_SPHERE: +2.75
OS_OVR_VA: 20/
OD_SPHERE: +1.50
OS_AXIS: 088
OD_AXIS: 067
OD_ADD: +3.00
OS_ADD: +3.00
OD_CYLINDER: -2.50

## 2025-01-20 ASSESSMENT — KERATOMETRY
OS_K2POWER_DIOPTERS: 45.75
OD_K1POWER_DIOPTERS: 45.75
OD_K2POWER_DIOPTERS: 46.50
OS_AXISANGLE_DEGREES: 164
OS_K1POWER_DIOPTERS: 44.50
OD_AXISANGLE_DEGREES: 137

## 2025-01-20 ASSESSMENT — PACHYMETRY
OD_CT_CORRECTION: 4
OS_CT_CORRECTION: 1
OS_CT_UM: 520
OD_CT_UM: 498

## 2025-01-20 ASSESSMENT — TEAR BREAK UP TIME (TBUT)
OD_TBUT: 1+
OS_TBUT: 1+

## 2025-01-20 ASSESSMENT — VISUAL ACUITY
OD_BCVA: 20/350
OS_BCVA: 20/60+1

## 2025-01-20 ASSESSMENT — CONFRONTATIONAL VISUAL FIELD TEST (CVF)
OS_FINDINGS: FULL
OD_FINDINGS: FULL

## 2025-01-20 ASSESSMENT — TONOMETRY
OD_IOP_MMHG: 14
OS_IOP_MMHG: 14

## 2025-05-05 ENCOUNTER — OFFICE (OUTPATIENT)
Facility: LOCATION | Age: 85
Setting detail: OPHTHALMOLOGY
End: 2025-05-05
Payer: COMMERCIAL

## 2025-05-05 DIAGNOSIS — H40.1133: ICD-10-CM

## 2025-05-05 DIAGNOSIS — H05.242: ICD-10-CM

## 2025-05-05 DIAGNOSIS — H16.223: ICD-10-CM

## 2025-05-05 DIAGNOSIS — H02.135: ICD-10-CM

## 2025-05-05 DIAGNOSIS — H43.813: ICD-10-CM

## 2025-05-05 PROBLEM — H43.812 POSTERIOR VITREOUS DETACHMENT; LEFT EYE: Status: ACTIVE | Noted: 2025-05-05

## 2025-05-05 PROCEDURE — 92133 CPTRZD OPH DX IMG PST SGM ON: CPT

## 2025-05-05 PROCEDURE — 92202 OPSCPY EXTND ON/MAC DRAW: CPT

## 2025-05-05 PROCEDURE — 99213 OFFICE O/P EST LOW 20 MIN: CPT

## 2025-05-05 ASSESSMENT — REFRACTION_AUTOREFRACTION
OD_CYLINDER: -1.75
OD_SPHERE: +1.00
OS_AXIS: 105
OS_CYLINDER: -1.00
OS_SPHERE: -1.50
OD_AXIS: 067

## 2025-05-05 ASSESSMENT — REFRACTION_CURRENTRX
OS_ADD: +3.00
OS_OVR_VA: 20/
OD_SPHERE: +1.50
OD_CYLINDER: -2.50
OD_OVR_VA: 20/
OS_CYLINDER: -0.50
OS_AXIS: 088
OD_ADD: +3.00
OS_SPHERE: +2.75
OD_AXIS: 067

## 2025-05-05 ASSESSMENT — REFRACTION_MANIFEST
OS_SPHERE: -1.50
OD_SPHERE: +1.00
OS_AXIS: 105
OS_VA1: 20/350
OD_VA1: 20/60-1
OS_CYLINDER: -1.00
OD_CYLINDER: -1.75
OD_AXIS: 067

## 2025-05-05 ASSESSMENT — KERATOMETRY
OS_K2POWER_DIOPTERS: 48.00
OD_AXISANGLE_DEGREES: 130
OS_AXISANGLE_DEGREES: 047
OD_K1POWER_DIOPTERS: 45.50
OD_K2POWER_DIOPTERS: 46.75
OS_K1POWER_DIOPTERS: 45.75

## 2025-05-05 ASSESSMENT — TEAR BREAK UP TIME (TBUT)
OS_TBUT: 1+
OD_TBUT: 1+

## 2025-05-05 ASSESSMENT — TONOMETRY
OD_IOP_MMHG: 14
OS_IOP_MMHG: 16

## 2025-05-05 ASSESSMENT — CONFRONTATIONAL VISUAL FIELD TEST (CVF)
OD_FINDINGS: FULL
OS_FINDINGS: CONSTRICTION

## 2025-05-05 ASSESSMENT — PACHYMETRY
OD_CT_UM: 498
OS_CT_CORRECTION: 1
OD_CT_CORRECTION: 4
OS_CT_UM: 520

## 2025-05-05 ASSESSMENT — VISUAL ACUITY
OS_BCVA: 20/60+1
OD_BCVA: 20/350